# Patient Record
Sex: MALE | Race: WHITE | NOT HISPANIC OR LATINO | Employment: OTHER | URBAN - METROPOLITAN AREA
[De-identification: names, ages, dates, MRNs, and addresses within clinical notes are randomized per-mention and may not be internally consistent; named-entity substitution may affect disease eponyms.]

---

## 2019-09-06 ENCOUNTER — TRANSFERRED RECORDS (OUTPATIENT)
Dept: HEALTH INFORMATION MANAGEMENT | Facility: CLINIC | Age: 69
End: 2019-09-06

## 2019-09-10 ENCOUNTER — TRANSFERRED RECORDS (OUTPATIENT)
Dept: HEALTH INFORMATION MANAGEMENT | Facility: CLINIC | Age: 69
End: 2019-09-10

## 2019-09-17 ENCOUNTER — TRANSFERRED RECORDS (OUTPATIENT)
Dept: HEALTH INFORMATION MANAGEMENT | Facility: CLINIC | Age: 69
End: 2019-09-17

## 2019-11-19 ENCOUNTER — TRANSFERRED RECORDS (OUTPATIENT)
Dept: HEALTH INFORMATION MANAGEMENT | Facility: CLINIC | Age: 69
End: 2019-11-19

## 2020-11-12 ENCOUNTER — TRANSFERRED RECORDS (OUTPATIENT)
Dept: HEALTH INFORMATION MANAGEMENT | Facility: CLINIC | Age: 70
End: 2020-11-12

## 2021-11-15 ENCOUNTER — TRANSFERRED RECORDS (OUTPATIENT)
Dept: HEALTH INFORMATION MANAGEMENT | Facility: CLINIC | Age: 71
End: 2021-11-15

## 2021-12-08 ENCOUNTER — TRANSFERRED RECORDS (OUTPATIENT)
Dept: HEALTH INFORMATION MANAGEMENT | Facility: CLINIC | Age: 71
End: 2021-12-08

## 2022-07-21 LAB
ALT SERPL-CCNC: 15 U/L (ref 18–65)
AST SERPL-CCNC: 32 U/L (ref 10–40)
CREATININE (EXTERNAL): 0.79 MG/DL (ref 0.8–1.5)
GFR ESTIMATED (EXTERNAL): >60 ML/MIN/1.73M2
GLUCOSE (EXTERNAL): 111 MG/DL (ref 60–99)
POTASSIUM (EXTERNAL): 3.4 MMOL/L (ref 3.5–5.1)
TSH SERPL-ACNC: 3.3 UIU/ML (ref 0.35–4.8)

## 2022-08-11 ENCOUNTER — TRANSFERRED RECORDS (OUTPATIENT)
Dept: HEALTH INFORMATION MANAGEMENT | Facility: CLINIC | Age: 72
End: 2022-08-11

## 2022-08-11 LAB — PHQ9 SCORE: 17

## 2022-08-12 ENCOUNTER — TRANSFERRED RECORDS (OUTPATIENT)
Dept: HEALTH INFORMATION MANAGEMENT | Facility: CLINIC | Age: 72
End: 2022-08-12

## 2022-08-17 ENCOUNTER — TRANSFERRED RECORDS (OUTPATIENT)
Dept: HEALTH INFORMATION MANAGEMENT | Facility: CLINIC | Age: 72
End: 2022-08-17

## 2022-08-17 LAB
ALT SERPL-CCNC: 14 U/L (ref 18–65)
AST SERPL-CCNC: 29 U/L (ref 10–40)
CREATININE (EXTERNAL): 1.28 MG/DL (ref 0.8–1.5)
GFR ESTIMATED (EXTERNAL): 55 ML/MIN/1.73M2
GLUCOSE (EXTERNAL): 94 MG/DL (ref 60–99)
INR (EXTERNAL): 1.3 (ref 0.9–1.1)
POTASSIUM (EXTERNAL): 4.5 MMOL/L (ref 3.5–5.1)

## 2022-08-19 ENCOUNTER — TRANSFERRED RECORDS (OUTPATIENT)
Dept: HEALTH INFORMATION MANAGEMENT | Facility: CLINIC | Age: 72
End: 2022-08-19

## 2022-08-30 ENCOUNTER — TRANSFERRED RECORDS (OUTPATIENT)
Dept: HEALTH INFORMATION MANAGEMENT | Facility: CLINIC | Age: 72
End: 2022-08-30

## 2022-10-05 ENCOUNTER — MEDICAL CORRESPONDENCE (OUTPATIENT)
Dept: HEALTH INFORMATION MANAGEMENT | Facility: CLINIC | Age: 72
End: 2022-10-05

## 2022-10-06 ENCOUNTER — TRANSFERRED RECORDS (OUTPATIENT)
Dept: HEALTH INFORMATION MANAGEMENT | Facility: CLINIC | Age: 72
End: 2022-10-06

## 2022-10-06 LAB
ALT SERPL-CCNC: 13 U/L (ref 18–65)
AST SERPL-CCNC: 20 U/L (ref 10–40)
CREATININE (EXTERNAL): 1.46 MG/DL (ref 0.8–1.5)
GFR ESTIMATED (EXTERNAL): 47 ML/MIN/1.73M2
GLUCOSE (EXTERNAL): 92 MG/DL (ref 60–99)
INR (EXTERNAL): 1.2 (ref 0.9–1.1)
POTASSIUM (EXTERNAL): 5.1 MMOL/L (ref 3.5–5.1)

## 2022-10-07 ENCOUNTER — REFERRAL (OUTPATIENT)
Dept: TRANSPLANT | Facility: CLINIC | Age: 72
End: 2022-10-07

## 2022-10-07 DIAGNOSIS — K70.30 ALCOHOLIC CIRRHOSIS (H): Primary | ICD-10-CM

## 2022-10-07 NOTE — LETTER
10/12/2022    Manolo Cho Jr.  [address] [address]          Dear Manolo,    Thank you for your interest in the Transplant Center at [facility]. We look forward to being a part of your care team and assisting you through the transplant process.    As we discussed, your transplant coordinator is Matthew Hernandez Jr. (Liver).  You may call your coordinator at any time with questions or concerns at [phone].    Please complete the following.    1. Fill out and return the enclosed forms    Authorization for Electronic Communication    Authorization to Discuss Protected Health Information    Authorization for Release of Protected Health Information    Authorization for Care Everywhere Release of Information    2. Sign up for:    [place], access to your electronic medical record (see enclosed pamphlet)    [email or web address], a transplant education website    You can use these tools to learn more about your transplant, communicate with your care team, and track your medical details      Sincerely,      Solid Organ Transplant  [facility]    cc: Referring Physician and PCP

## 2022-10-07 NOTE — LETTER
May 17, 2023    Manolo Cho   [address] [address]      Dear Mr. Cho,   The purpose of this letter is to reach out and see if you are back from [place], and if you are interested in having a consultation with one of our liver specialist. We have one that has a clinic in [place] once a month who we can get you in with.   If you are interested, please contact your coordinator below.   Important things you should know:  If you would like to discuss the decision, or if your medical status changes you may schedule a return visits with your doctor by calling [phone] and asking to speak to your transplant coordinator.  We recommend that you continue to follow up with your primary care and referring GI doctor in order to manage your health concerns.  We want you to know that our program has physician and surgeon coverage 24 hours a day, 365 days a year.  Attached is a letter from [facility] that describes the services and information offered to patients by [facility] and the Organ Procurement and Transplantation Network (OPTN).  Thank you for allowing us to participate in your care.  We wish you well.  Sincerely,    Matthew Hernandez Jr., BSN, RN  Liver Transplant Coordinator  [phone]    Enclosures: [facility] Letter  cc: Care Team            The Organ Procurement and Transplantation Network  Toll-free patient services line:     Your resource for organ transplant information    If you have a question regarding your own medical care, you always should call your transplant hospital first. However, for general organ transplant-related information, you can call the Organ Procurement and Transplantation Network (OPTN) toll-free patient services line at [phone]- [phone]. Anyone, including potential transplant candidates, candidates, recipients, family members, friends, living donors, and donor family members, can call this number to:      Talk about organ donation, living donation, the transplant process, the donation  process, and transplant policies.  Get a free patient information kit with helpful booklets, waiting list and transplant information, and a list of all transplant hospitals.  Ask questions about the OPTN website (https://optn.transplant.hrsa.gov/), the United Network for Organ Sharing s (UNOS) website (https://unos.org/), or the UNOS website for living donors and transplant recipients. (https://www.transplantliving.org/).  Learn how the OPTN can help you.  Talk about any concerns that you may have with a transplant hospital.    The [facility] transplant system, the OPTN, is managed under federal contract by the United Network for Organ Sharing (UNOS), which is a non-profit charitable organization. The OPTN helps create and define organ sharing policies that make the best use of donated organs. This process continuously evaluating new advances and discoveries so policies can be adapted to best serve patients waiting for transplants. To do so, the OPTN works closely with transplant professionals, transplant patients, transplant candidates, donor families, living donors, and the public. All transplant programs and organ procurement organizations throughout the country are OPTN members and are obligated to follow the policies the OPTN creates for allocating organs.    The OPTN also is responsible for:    Providing educational material for patients, the public, and professionals.  Raising awareness of the need for donated organs and tissue.  Coordinating organ procurement, matching, and placement.  Collecting information about every organ transplant and donation that occurs in the [place].    Remember, you should contact your transplant hospital directly if you have questions or concerns about your own medical care including medical records, work-up progress, and test results.    We are not your transplant hospital, and our staff will not be able to answer questions about your case, so please keep your transplant  hospital s phone number handy.    However, while you research your transplant needs and learn as much as you can about transplantation and donation, we welcome your call to our toll-free patient services line at [phone].          Updated 4/1/2019

## 2022-10-12 VITALS — BODY MASS INDEX: 19.7 KG/M2 | WEIGHT: 130 LBS | HEIGHT: 68 IN

## 2022-10-12 NOTE — TELEPHONE ENCOUNTER
Patient was asked the following questions during liver intake call.     Referring Provider: Dr. 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 Flex Casarez    1)Do you know why you have liver disease: Yes       If Alcoholic Cirrhosis is present when was your last drink: 7/15/22       Have you ever been through treatment for alcohol: No  2) Presence of Ascites: Yes Paracentesis: Yes  3) Presence of Hepatic Encephalopathy: No Medications: No  4) History of GI Bleeding: No  5) Oxygen Use: None  6) EGD: No  7) Colonoscopy: Yes   8) MELD Score: 23  9) Labs available for review from PCP/GI: Yes-Scanned  10)HCC Diagnosis: No                11)Insurance information: Medicare/[employer]             Policy Hassan: Self              Subscriber/Policy/ID Number: [id number]             Group Number:     Referral intake process completed.  Patient is aware that after financial approval is received, medical records will be requested.   Patient confirmed for a callback from transplant coordinator on 10/20/22.  Tentative evaluation date TBD.    Confirmed coordinator will discuss evaluation process in more detail at the time of their call.   Patient is aware of the need to arrange age appropriate cancer screening, vaccinations, and dental care.  Reminded patient to complete questionnaire, complete medical records release, and review packet prior to evaluation visit .  Assessed patient for special needs (ie--wheelchair, assistance, guardian, and ):  None  Patient instructed to call [phone] with questions.     Patient gave verbal consent during intake call to obtain medical records and documents outside of [facility]:  Yes    FELIX Polk, LPN       Solid Organ Transplant

## 2022-10-19 ENCOUNTER — TELEPHONE (OUTPATIENT)
Dept: TRANSPLANT | Facility: CLINIC | Age: 72
End: 2022-10-19

## 2022-10-19 NOTE — TELEPHONE ENCOUNTER
General  Route to LPN     Reason for call: Manolo called to let us know they will need to reschedule the call with the coordinator since he has his colonoscopy tomorrow.      Call back needed? Yes  Return Call Needed  Same as documented in contacts section  When to return call?: Same day: Route High Priority

## 2022-10-20 ENCOUNTER — TRANSFERRED RECORDS (OUTPATIENT)
Dept: HEALTH INFORMATION MANAGEMENT | Facility: CLINIC | Age: 72
End: 2022-10-20

## 2022-10-20 NOTE — TELEPHONE ENCOUNTER
Patient called to reschedule referral call.    Chart review:    Last ETOH 7/15/22    Coronary calcifications seen on CT scans and carotid artery ultrasound    Dilated thoracic aorta    Lung nodule on chest CT    72 years old    Likely to be seen as consult, will attempt to reschedule intake call.

## 2022-10-21 ENCOUNTER — TELEPHONE (OUTPATIENT)
Dept: TRANSPLANT | Facility: CLINIC | Age: 72
End: 2022-10-21

## 2022-10-21 NOTE — TELEPHONE ENCOUNTER
General  Route to LPN    Reason for call: Manolo received his Welcome Letter but there were no forms attached. Manolo wanted Susy to know writer will have forms resent     Call back needed? No

## 2022-10-27 ENCOUNTER — DOCUMENTATION ONLY (OUTPATIENT)
Dept: TRANSPLANT | Facility: CLINIC | Age: 72
End: 2022-10-27

## 2022-10-27 NOTE — TELEPHONE ENCOUNTER
LIVER DISEASE ETOH   REFERRING PROVIDER Natalya Moran   [facility]  -----------------------------------------------------------------------------------------------------------------------------  MELD  23  >>> 15            HISTORY OF LIVER DISEASE  First dx in July 2022, prior did not know about liver disease Last ETOH July 2022.  Annual physical reviewed    Dr. Flex Casarez- former PCP that did not discuss any liver problems,  But then went to different PCP with Dr. Singleton; was having BLE edema, sent to Dr. Moran (GI) at [facility]    Started getting para at that point, took out 10 L plus on first para.  Only para he ever had.  Started on diuretics.      Had colonoscopy and EGD last week at [facility], had labs done at that time.      Edema and ascites improved, does have muscle wasting- discussed upping protein intake; already follows low sodium diet.     Ascites  Alexandru Furosemide yes  Partha only once on initial dx  TIPS no  HE   Kidney function denies   Variceal screening Last EGD. Location. Varices no/yes. Rec follow up.  HCC Screening Last imaging and location  Alcohol use were drinking 2-3 drinks a day, never drank to get drunk, friends were drinking    Hospitalizations  ---------------------------------------------------------------------------------------------------------------------------------  [facility]  CT showed coronary artery calcifications, dilated thoracic aorta seen on 8/12/22 CT    Denies cardiac sx or hx, unaware of CT noted above    Pulm- denies; was 'heavy smoker, now  down to just under a pack a day'     Diabetes no     Abdominal surgery never had surgeries  CRC Screening- a week ago    Vax: never had COVID vax, limited vax      SHX  - wide [employer],  16 years; lives in house, [place] in summer and winter in [place]- Jan to end of April    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  Plan for in person consult with [facility] in [place].  However patient catrachita not return until April from [place]    .

## 2022-10-27 NOTE — PROGRESS NOTES
Verbal consent to access all medical records, inclusing Care Everywhere from [facility]

## 2022-11-17 LAB — PHQ9 SCORE: 4

## 2022-12-01 ENCOUNTER — TRANSFERRED RECORDS (OUTPATIENT)
Dept: HEALTH INFORMATION MANAGEMENT | Facility: CLINIC | Age: 72
End: 2022-12-01

## 2022-12-13 ENCOUNTER — TRANSFERRED RECORDS (OUTPATIENT)
Dept: HEALTH INFORMATION MANAGEMENT | Facility: CLINIC | Age: 72
End: 2022-12-13

## 2022-12-15 ENCOUNTER — TRANSFERRED RECORDS (OUTPATIENT)
Dept: HEALTH INFORMATION MANAGEMENT | Facility: CLINIC | Age: 72
End: 2022-12-15

## 2022-12-15 LAB
ALT SERPL-CCNC: 15 U/L (ref 18–65)
AST SERPL-CCNC: 28 U/L (ref 10–40)
CREATININE (EXTERNAL): 1.29 MG/DL (ref 0.8–1.5)
GFR ESTIMATED (EXTERNAL): 55 ML/MIN/1.73M2
GLUCOSE (EXTERNAL): 81 MG/DL (ref 60–99)
INR (EXTERNAL): 1.1 (ref 0.9–1.1)
POTASSIUM (EXTERNAL): 4.6 MMOL/L (ref 3.5–5.1)

## 2023-02-01 NOTE — TELEPHONE ENCOUNTER
Liver Transplant Referral Status  February 1, 2023    Patient's case reviewed via chart review.  Plan was made in regards to the status of the patient's Liver Transplant Referral:    Spoke with patient, he is feeling well.  He saw Dr. Moran in December/January timeframe and he is now in [place].  Requested records from the last visit with Dr. Moran to help make a plan moving forward.  Patient appreciated the call and will continue to follow at this time.    Patient encouraged to contact the Liver Transplant Team with any further questions and/or concerns related to the status of his/her evaluation.  Primary liver coordinator notified.

## 2023-05-17 ENCOUNTER — DOCUMENTATION ONLY (OUTPATIENT)
Dept: TRANSPLANT | Facility: CLINIC | Age: 73
End: 2023-05-17
Payer: COMMERCIAL

## 2023-05-17 NOTE — TELEPHONE ENCOUNTER
Called and LVM for patient about referral    LVM with direct number if he is interested in consult with Dr. Sherwood in [place].    MELD was 10 last check, 12/2022.    Will close referral at this time and send letter.
